# Patient Record
Sex: FEMALE | Race: WHITE | NOT HISPANIC OR LATINO | Employment: STUDENT | URBAN - NONMETROPOLITAN AREA
[De-identification: names, ages, dates, MRNs, and addresses within clinical notes are randomized per-mention and may not be internally consistent; named-entity substitution may affect disease eponyms.]

---

## 2023-01-25 ENCOUNTER — TELEPHONE (OUTPATIENT)
Dept: OBSTETRICS AND GYNECOLOGY | Facility: CLINIC | Age: 23
End: 2023-01-25
Payer: COMMERCIAL

## 2023-01-25 NOTE — TELEPHONE ENCOUNTER
Caller: Flower Luciano    Relationship: Self    Best call back number: [phone]     What is the best time to reach you: ANYTIME    What was the call regarding: PT IS CALLING BECAUSE 1 WEEK AGO (ON 01/18/23) PT BEGAN TO EXPEL DARK BROWN BLOOD. PT HAS CONTINUED TO BLEED SINCE THEN AND HER PERIOD WAS DUE TO START TODAY, 01/25/23, SO SHE ADVISES SHE IS BLEEDING EVEN HEAVIER NOW. PT IS WORRIED SOMETHING IS WRONG AND WANTS TO BE SEEN SOONER THAN 03/02/23 BUT THAT IS THE EARLIEST APPT AVAILABLE. PT ASKS THAT PROVIDER OR NURSE TEAM CALL HER BACK SO SHE CAN ASK QUESTIONS / SEE IF SHE CAN BE SEEN EARLIER THAN 03/02/23     Do you require a callback: YES, OKAY TO LVM

## 2023-03-02 ENCOUNTER — OFFICE VISIT (OUTPATIENT)
Dept: OBSTETRICS AND GYNECOLOGY | Facility: CLINIC | Age: 23
End: 2023-03-02
Payer: COMMERCIAL

## 2023-03-02 VITALS
DIASTOLIC BLOOD PRESSURE: 68 MMHG | SYSTOLIC BLOOD PRESSURE: 118 MMHG | WEIGHT: 149 LBS | HEIGHT: 68 IN | BODY MASS INDEX: 22.58 KG/M2

## 2023-03-02 DIAGNOSIS — Z12.4 SCREENING FOR CERVICAL CANCER: ICD-10-CM

## 2023-03-02 DIAGNOSIS — Z01.419 ROUTINE GYNECOLOGICAL EXAMINATION: Primary | ICD-10-CM

## 2023-03-02 DIAGNOSIS — Z30.011 ENCOUNTER FOR INITIAL PRESCRIPTION OF CONTRACEPTIVE PILLS: ICD-10-CM

## 2023-03-02 PROCEDURE — 99385 PREV VISIT NEW AGE 18-39: CPT | Performed by: PHYSICIAN ASSISTANT

## 2023-03-02 RX ORDER — CITALOPRAM 40 MG/1
40 TABLET ORAL DAILY
COMMUNITY
Start: 2022-12-14

## 2023-03-02 NOTE — PROGRESS NOTES
"Subjective   Chief Complaint   Patient presents with   • Gynecologic Exam     Patient has never had a pap, No complaints       Flower Luciano is a 22 y.o. year old  presenting to be seen for first gynecological exam.   She has no complaints or concerns  She has been on Sprintec OCPs since about 2017.  OCPs previously prescribed by her PCP.  She is having a regular monthly light bleed with Sprintec and her LMP was 1 week ago        History reviewed. No pertinent past medical history.     Current Outpatient Medications:   •  citalopram (CeleXA) 40 MG tablet, Take 1 tablet by mouth Daily., Disp: , Rfl:   •  Sprintec 28 0.25-35 MG-MCG per tablet, Take 1 tablet by mouth Daily., Disp: 84 tablet, Rfl: 4   Allergies   Allergen Reactions   • Gluten Meal Other (See Comments)     Gluten sensitive      History reviewed. No pertinent surgical history.   Social History     Socioeconomic History   • Marital status: Single   Tobacco Use   • Smoking status: Never   Vaping Use   • Vaping Use: Never used   Substance and Sexual Activity   • Alcohol use: Yes     Alcohol/week: 4.0 standard drinks     Types: 2 Glasses of wine, 2 Shots of liquor per week   • Drug use: Never   • Sexual activity: Yes     Partners: Male     Birth control/protection: Pill, Birth control pill      Family History   Problem Relation Age of Onset   • Diabetes Mother         Both her mom snd dad have it as well       Review of Systems   Constitutional: Negative for chills, diaphoresis and fever.   Gastrointestinal: Negative.    Genitourinary: Negative for difficulty urinating, dysuria, menstrual problem and pelvic pain.           Objective   /68   Ht 172.7 cm (68\")   Wt 67.6 kg (149 lb)   LMP 2023 (Exact Date)   BMI 22.66 kg/m²     Physical Exam  Exam conducted with a chaperone present.   Constitutional:       Appearance: Normal appearance. She is well-developed and well-groomed.   Eyes:      General: Lids are normal.      Extraocular " Movements: Extraocular movements intact.      Conjunctiva/sclera: Conjunctivae normal.   Chest:   Breasts:     Breasts are symmetrical.      Right: No inverted nipple, mass, nipple discharge, skin change or tenderness.      Left: No inverted nipple, mass, nipple discharge, skin change or tenderness.   Abdominal:      Palpations: Abdomen is soft.      Tenderness: There is no abdominal tenderness.   Genitourinary:     Labia:         Right: No rash, tenderness or lesion.         Left: No rash, tenderness or lesion.       Urethra: No prolapse, urethral pain, urethral swelling or urethral lesion.      Vagina: No vaginal discharge, tenderness or lesions.      Cervix: No cervical motion tenderness, discharge, friability or lesion.      Uterus: Not enlarged and not tender.       Adnexa:         Right: No mass or tenderness.          Left: No mass or tenderness.     Lymphadenopathy:      Upper Body:      Right upper body: No axillary adenopathy.      Left upper body: No axillary adenopathy.   Neurological:      General: No focal deficit present.      Mental Status: She is alert and oriented to person, place, and time.   Psychiatric:         Attention and Perception: Attention normal.         Mood and Affect: Mood normal.         Speech: Speech normal.         Behavior: Behavior is cooperative.            Result Review :                   Assessment and Plan  Diagnoses and all orders for this visit:    1. Routine gynecological examination (Primary)    2. Screening for cervical cancer  -     LIQUID-BASED PAP SMEAR, [facility] ([facility])    3. Encounter for initial prescription of contraceptive pills    Other orders  -     Sprintec 28 0.25-35 MG-MCG per tablet; Take 1 tablet by mouth Daily.  Dispense: 84 tablet; Refill: 4      Patient Instructions   Reminded to take ocp consistently  RTO 1 year or prn             This note was electronically signed.    Deyanira Sumner PA-C   March 2, 2023

## 2023-03-06 LAB — REF LAB TEST METHOD: NORMAL
